# Patient Record
Sex: MALE | URBAN - METROPOLITAN AREA
[De-identification: names, ages, dates, MRNs, and addresses within clinical notes are randomized per-mention and may not be internally consistent; named-entity substitution may affect disease eponyms.]

---

## 2023-09-05 ENCOUNTER — NURSE TRIAGE (OUTPATIENT)
Dept: NURSING | Facility: CLINIC | Age: 53
End: 2023-09-05

## 2023-09-05 NOTE — TELEPHONE ENCOUNTER
Stepped now on a nail at work. R foot , did puncture through rubber shoe. Has stopped bleeding.     Protocol reviewed, care advice given, Is not [facility] patient, Disposition: to be seen in 4 hours, [place] [facility] [place]     MARICEL NULL RN  [facility] nurse advisors  9/5/2023  5:55 AM  Reason for Disposition   [1] Puncture wound of foot AND [2] puncture went through shoe (e.g., tennis shoe)    Additional Information   Negative: [1] Puncture wound of head, neck, chest, back, or abdomen AND [2] sounds life-threatening to the triager   Negative: Shock suspected (e.g., cold/pale/clammy skin, too weak to stand, low BP, rapid pulse)   Negative: Sounds like a life-threatening emergency to the triager   Negative: [1] Caused by a needlestick or other sharp object AND [2] possible exposure to another person's body fluids   Negative: Caused by an animal bite   Negative: Caused by a human bite   Negative: Caused by a marine animal sting or bite   Negative: Skin is cut or scraped, not punctured   Negative: Puncture wound of eye or eyelid   Negative: Foreign body is still in the skin (e.g., splinter, sliver, fishhook)   Negative: [1] Puncture wound of head, neck, chest, abdomen, or overlying a joint AND [2] could be deep   Negative: High pressure injection injury (e.g., from grease gun or paint gun, usually work-related)   Negative: Sounds like a serious injury to the triager   Negative: [1] SEVERE pain AND [2] not improved 2 hours after pain medicine   Negative: [1] Puncture wound of foot AND [2] hurts too much to walk on it (i.e., unable to bear weight, severe limp)   Negative: [1] Puncture wound of bare foot (no shoes) AND [2] setting was dirty  (Exception: Shallow puncture.)    Protocols used: Puncture Wound-A-